# Patient Record
Sex: FEMALE | Race: WHITE | NOT HISPANIC OR LATINO | Employment: FULL TIME | URBAN - METROPOLITAN AREA
[De-identification: names, ages, dates, MRNs, and addresses within clinical notes are randomized per-mention and may not be internally consistent; named-entity substitution may affect disease eponyms.]

---

## 2017-03-06 ENCOUNTER — ANESTHESIA (OUTPATIENT)
Dept: PERIOP | Facility: HOSPITAL | Age: 47
End: 2017-03-06

## 2017-03-06 ENCOUNTER — ANESTHESIA EVENT (OUTPATIENT)
Dept: PERIOP | Facility: HOSPITAL | Age: 47
End: 2017-03-06

## 2017-03-06 ENCOUNTER — HOSPITAL ENCOUNTER (OUTPATIENT)
Facility: HOSPITAL | Age: 47
Setting detail: HOSPITAL OUTPATIENT SURGERY
Discharge: HOME | End: 2017-03-06
Attending: OBSTETRICS & GYNECOLOGY | Admitting: OBSTETRICS & GYNECOLOGY

## 2017-03-06 VITALS
HEART RATE: 60 BPM | OXYGEN SATURATION: 98 % | WEIGHT: 141.9 LBS | SYSTOLIC BLOOD PRESSURE: 128 MMHG | BODY MASS INDEX: 24.22 KG/M2 | HEIGHT: 64 IN | DIASTOLIC BLOOD PRESSURE: 60 MMHG | TEMPERATURE: 97.9 F | RESPIRATION RATE: 18 BRPM

## 2017-03-06 DIAGNOSIS — O03.4 INCOMPLETE MISCARRIAGE: ICD-10-CM

## 2017-03-06 LAB
ABO GROUP BLD: NORMAL
BASOPHILS # BLD AUTO: 0.01 10*3/MM3 (ref 0–0.2)
BASOPHILS NFR BLD AUTO: 0.1 % (ref 0–1.5)
BLD GP AB SCN SERPL QL: NEGATIVE
DEPRECATED RDW RBC AUTO: 42.3 FL (ref 37–54)
EOSINOPHIL # BLD AUTO: 0.06 10*3/MM3 (ref 0–0.7)
EOSINOPHIL NFR BLD AUTO: 0.7 % (ref 0.3–6.2)
ERYTHROCYTE [DISTWIDTH] IN BLOOD BY AUTOMATED COUNT: 12.5 % (ref 11.7–13)
HCT VFR BLD AUTO: 38.7 % (ref 35.6–45.5)
HGB BLD-MCNC: 13.1 G/DL (ref 11.9–15.5)
IMM GRANULOCYTES # BLD: 0 10*3/MM3 (ref 0–0.03)
IMM GRANULOCYTES NFR BLD: 0 % (ref 0–0.5)
LYMPHOCYTES # BLD AUTO: 1.75 10*3/MM3 (ref 0.9–4.8)
LYMPHOCYTES NFR BLD AUTO: 19.1 % (ref 19.6–45.3)
MCH RBC QN AUTO: 31.3 PG (ref 26.9–32)
MCHC RBC AUTO-ENTMCNC: 33.9 G/DL (ref 32.4–36.3)
MCV RBC AUTO: 92.6 FL (ref 80.5–98.2)
MONOCYTES # BLD AUTO: 0.8 10*3/MM3 (ref 0.2–1.2)
MONOCYTES NFR BLD AUTO: 8.8 % (ref 5–12)
NEUTROPHILS # BLD AUTO: 6.52 10*3/MM3 (ref 1.9–8.1)
NEUTROPHILS NFR BLD AUTO: 71.3 % (ref 42.7–76)
PLATELET # BLD AUTO: 226 10*3/MM3 (ref 140–500)
PMV BLD AUTO: 9.5 FL (ref 6–12)
RBC # BLD AUTO: 4.18 10*6/MM3 (ref 3.9–5.2)
RH BLD: POSITIVE
WBC NRBC COR # BLD: 9.14 10*3/MM3 (ref 4.5–10.7)

## 2017-03-06 PROCEDURE — 25010000002 HYDROMORPHONE PER 4 MG: Performed by: NURSE ANESTHETIST, CERTIFIED REGISTERED

## 2017-03-06 PROCEDURE — 86850 RBC ANTIBODY SCREEN: CPT | Performed by: OBSTETRICS & GYNECOLOGY

## 2017-03-06 PROCEDURE — 25010000002 FENTANYL CITRATE (PF) 100 MCG/2ML SOLUTION: Performed by: NURSE ANESTHETIST, CERTIFIED REGISTERED

## 2017-03-06 PROCEDURE — 25010000002 MIDAZOLAM PER 1 MG: Performed by: ANESTHESIOLOGY

## 2017-03-06 PROCEDURE — 88305 TISSUE EXAM BY PATHOLOGIST: CPT | Performed by: OBSTETRICS & GYNECOLOGY

## 2017-03-06 PROCEDURE — 85025 COMPLETE CBC W/AUTO DIFF WBC: CPT | Performed by: OBSTETRICS & GYNECOLOGY

## 2017-03-06 PROCEDURE — 86900 BLOOD TYPING SEROLOGIC ABO: CPT | Performed by: OBSTETRICS & GYNECOLOGY

## 2017-03-06 PROCEDURE — 25010000002 PROPOFOL 10 MG/ML EMULSION: Performed by: NURSE ANESTHETIST, CERTIFIED REGISTERED

## 2017-03-06 PROCEDURE — 86901 BLOOD TYPING SEROLOGIC RH(D): CPT | Performed by: OBSTETRICS & GYNECOLOGY

## 2017-03-06 PROCEDURE — 25010000002 DEXAMETHASONE PER 1 MG: Performed by: NURSE ANESTHETIST, CERTIFIED REGISTERED

## 2017-03-06 PROCEDURE — 25010000002 METHYLERGONOVINE MALEATE PER 0.2 MG: Performed by: NURSE ANESTHETIST, CERTIFIED REGISTERED

## 2017-03-06 PROCEDURE — 25010000002 FENTANYL CITRATE (PF) 100 MCG/2ML SOLUTION

## 2017-03-06 RX ORDER — LABETALOL HYDROCHLORIDE 5 MG/ML
5 INJECTION, SOLUTION INTRAVENOUS
Status: DISCONTINUED | OUTPATIENT
Start: 2017-03-06 | End: 2017-03-06 | Stop reason: HOSPADM

## 2017-03-06 RX ORDER — MIDAZOLAM HYDROCHLORIDE 1 MG/ML
2 INJECTION INTRAMUSCULAR; INTRAVENOUS
Status: DISCONTINUED | OUTPATIENT
Start: 2017-03-06 | End: 2017-03-06 | Stop reason: HOSPADM

## 2017-03-06 RX ORDER — SODIUM CHLORIDE 0.9 % (FLUSH) 0.9 %
1-10 SYRINGE (ML) INJECTION AS NEEDED
Status: DISCONTINUED | OUTPATIENT
Start: 2017-03-06 | End: 2017-03-06 | Stop reason: HOSPADM

## 2017-03-06 RX ORDER — FENTANYL CITRATE 50 UG/ML
50 INJECTION, SOLUTION INTRAMUSCULAR; INTRAVENOUS
Status: DISCONTINUED | OUTPATIENT
Start: 2017-03-06 | End: 2017-03-06 | Stop reason: HOSPADM

## 2017-03-06 RX ORDER — PROMETHAZINE HYDROCHLORIDE 25 MG/1
25 SUPPOSITORY RECTAL ONCE AS NEEDED
Status: DISCONTINUED | OUTPATIENT
Start: 2017-03-06 | End: 2017-03-06 | Stop reason: HOSPADM

## 2017-03-06 RX ORDER — PROMETHAZINE HYDROCHLORIDE 25 MG/ML
12.5 INJECTION, SOLUTION INTRAMUSCULAR; INTRAVENOUS ONCE AS NEEDED
Status: DISCONTINUED | OUTPATIENT
Start: 2017-03-06 | End: 2017-03-06 | Stop reason: HOSPADM

## 2017-03-06 RX ORDER — PROPOFOL 10 MG/ML
VIAL (ML) INTRAVENOUS AS NEEDED
Status: DISCONTINUED | OUTPATIENT
Start: 2017-03-06 | End: 2017-03-06 | Stop reason: SURG

## 2017-03-06 RX ORDER — SODIUM CHLORIDE, SODIUM LACTATE, POTASSIUM CHLORIDE, CALCIUM CHLORIDE 600; 310; 30; 20 MG/100ML; MG/100ML; MG/100ML; MG/100ML
9 INJECTION, SOLUTION INTRAVENOUS CONTINUOUS
Status: DISCONTINUED | OUTPATIENT
Start: 2017-03-06 | End: 2017-03-06 | Stop reason: HOSPADM

## 2017-03-06 RX ORDER — METHYLERGONOVINE MALEATE 0.2 MG/ML
INJECTION INTRAVENOUS AS NEEDED
Status: DISCONTINUED | OUTPATIENT
Start: 2017-03-06 | End: 2017-03-06 | Stop reason: SURG

## 2017-03-06 RX ORDER — LIDOCAINE HYDROCHLORIDE 20 MG/ML
INJECTION, SOLUTION INFILTRATION; PERINEURAL AS NEEDED
Status: DISCONTINUED | OUTPATIENT
Start: 2017-03-06 | End: 2017-03-06 | Stop reason: SURG

## 2017-03-06 RX ORDER — FENTANYL CITRATE 50 UG/ML
INJECTION, SOLUTION INTRAMUSCULAR; INTRAVENOUS AS NEEDED
Status: DISCONTINUED | OUTPATIENT
Start: 2017-03-06 | End: 2017-03-06 | Stop reason: SURG

## 2017-03-06 RX ORDER — PROMETHAZINE HYDROCHLORIDE 25 MG/1
25 TABLET ORAL ONCE AS NEEDED
Status: DISCONTINUED | OUTPATIENT
Start: 2017-03-06 | End: 2017-03-06 | Stop reason: HOSPADM

## 2017-03-06 RX ORDER — MIDAZOLAM HYDROCHLORIDE 1 MG/ML
1 INJECTION INTRAMUSCULAR; INTRAVENOUS
Status: DISCONTINUED | OUTPATIENT
Start: 2017-03-06 | End: 2017-03-06 | Stop reason: HOSPADM

## 2017-03-06 RX ORDER — PROMETHAZINE HYDROCHLORIDE 25 MG/1
12.5 TABLET ORAL ONCE AS NEEDED
Status: DISCONTINUED | OUTPATIENT
Start: 2017-03-06 | End: 2017-03-06 | Stop reason: HOSPADM

## 2017-03-06 RX ORDER — HYDROMORPHONE HYDROCHLORIDE 1 MG/ML
0.5 INJECTION, SOLUTION INTRAMUSCULAR; INTRAVENOUS; SUBCUTANEOUS
Status: DISCONTINUED | OUTPATIENT
Start: 2017-03-06 | End: 2017-03-06 | Stop reason: HOSPADM

## 2017-03-06 RX ORDER — FLUMAZENIL 0.1 MG/ML
0.2 INJECTION INTRAVENOUS AS NEEDED
Status: DISCONTINUED | OUTPATIENT
Start: 2017-03-06 | End: 2017-03-06 | Stop reason: HOSPADM

## 2017-03-06 RX ORDER — DIPHENHYDRAMINE HYDROCHLORIDE 50 MG/ML
12.5 INJECTION INTRAMUSCULAR; INTRAVENOUS
Status: DISCONTINUED | OUTPATIENT
Start: 2017-03-06 | End: 2017-03-06 | Stop reason: HOSPADM

## 2017-03-06 RX ORDER — FENTANYL CITRATE 50 UG/ML
INJECTION, SOLUTION INTRAMUSCULAR; INTRAVENOUS
Status: COMPLETED
Start: 2017-03-06 | End: 2017-03-06

## 2017-03-06 RX ORDER — OXYCODONE AND ACETAMINOPHEN 7.5; 325 MG/1; MG/1
1 TABLET ORAL ONCE AS NEEDED
Status: COMPLETED | OUTPATIENT
Start: 2017-03-06 | End: 2017-03-06

## 2017-03-06 RX ORDER — ONDANSETRON 2 MG/ML
4 INJECTION INTRAMUSCULAR; INTRAVENOUS ONCE AS NEEDED
Status: DISCONTINUED | OUTPATIENT
Start: 2017-03-06 | End: 2017-03-06 | Stop reason: HOSPADM

## 2017-03-06 RX ORDER — PROMETHAZINE HYDROCHLORIDE 25 MG/ML
5 INJECTION, SOLUTION INTRAMUSCULAR; INTRAVENOUS
Status: DISCONTINUED | OUTPATIENT
Start: 2017-03-06 | End: 2017-03-06 | Stop reason: HOSPADM

## 2017-03-06 RX ORDER — NALOXONE HCL 0.4 MG/ML
0.2 VIAL (ML) INJECTION AS NEEDED
Status: DISCONTINUED | OUTPATIENT
Start: 2017-03-06 | End: 2017-03-06 | Stop reason: HOSPADM

## 2017-03-06 RX ORDER — LIDOCAINE HYDROCHLORIDE 10 MG/ML
INJECTION, SOLUTION EPIDURAL; INFILTRATION; INTRACAUDAL; PERINEURAL AS NEEDED
Status: DISCONTINUED | OUTPATIENT
Start: 2017-03-06 | End: 2017-03-06 | Stop reason: HOSPADM

## 2017-03-06 RX ORDER — HYDROCODONE BITARTRATE AND ACETAMINOPHEN 7.5; 325 MG/1; MG/1
1 TABLET ORAL ONCE AS NEEDED
Status: DISCONTINUED | OUTPATIENT
Start: 2017-03-06 | End: 2017-03-06 | Stop reason: HOSPADM

## 2017-03-06 RX ORDER — HYDRALAZINE HYDROCHLORIDE 20 MG/ML
5 INJECTION INTRAMUSCULAR; INTRAVENOUS
Status: DISCONTINUED | OUTPATIENT
Start: 2017-03-06 | End: 2017-03-06 | Stop reason: HOSPADM

## 2017-03-06 RX ORDER — DEXAMETHASONE SODIUM PHOSPHATE 10 MG/ML
INJECTION INTRAMUSCULAR; INTRAVENOUS AS NEEDED
Status: DISCONTINUED | OUTPATIENT
Start: 2017-03-06 | End: 2017-03-06 | Stop reason: SURG

## 2017-03-06 RX ORDER — FAMOTIDINE 10 MG/ML
20 INJECTION, SOLUTION INTRAVENOUS ONCE
Status: COMPLETED | OUTPATIENT
Start: 2017-03-06 | End: 2017-03-06

## 2017-03-06 RX ADMIN — SODIUM CHLORIDE, POTASSIUM CHLORIDE, SODIUM LACTATE AND CALCIUM CHLORIDE: 600; 310; 30; 20 INJECTION, SOLUTION INTRAVENOUS at 11:42

## 2017-03-06 RX ADMIN — DEXAMETHASONE SODIUM PHOSPHATE 8 MG: 10 INJECTION INTRAMUSCULAR; INTRAVENOUS at 12:01

## 2017-03-06 RX ADMIN — HYDROMORPHONE HYDROCHLORIDE 0.5 MG: 1 INJECTION, SOLUTION INTRAMUSCULAR; INTRAVENOUS; SUBCUTANEOUS at 13:11

## 2017-03-06 RX ADMIN — FENTANYL CITRATE 50 MCG: 50 INJECTION INTRAMUSCULAR; INTRAVENOUS at 12:57

## 2017-03-06 RX ADMIN — EPHEDRINE SULFATE 5 MG: 50 INJECTION INTRAMUSCULAR; INTRAVENOUS; SUBCUTANEOUS at 11:58

## 2017-03-06 RX ADMIN — METHYLERGONOVINE MALEATE 200 MCG: 0.2 INJECTION INTRAMUSCULAR; INTRAVENOUS at 12:06

## 2017-03-06 RX ADMIN — HYDROMORPHONE HYDROCHLORIDE 0.5 MG: 1 INJECTION, SOLUTION INTRAMUSCULAR; INTRAVENOUS; SUBCUTANEOUS at 13:31

## 2017-03-06 RX ADMIN — FAMOTIDINE 20 MG: 10 INJECTION, SOLUTION INTRAVENOUS at 11:04

## 2017-03-06 RX ADMIN — OXYCODONE HYDROCHLORIDE AND ACETAMINOPHEN 1 TABLET: 7.5; 325 TABLET ORAL at 13:31

## 2017-03-06 RX ADMIN — FENTANYL CITRATE 50 MCG: 50 INJECTION INTRAMUSCULAR; INTRAVENOUS at 11:46

## 2017-03-06 RX ADMIN — HYDROMORPHONE HYDROCHLORIDE 0.5 MG: 1 INJECTION, SOLUTION INTRAMUSCULAR; INTRAVENOUS; SUBCUTANEOUS at 13:20

## 2017-03-06 RX ADMIN — PROPOFOL 200 MG: 10 INJECTION, EMULSION INTRAVENOUS at 11:46

## 2017-03-06 RX ADMIN — MIDAZOLAM 2 MG: 1 INJECTION INTRAMUSCULAR; INTRAVENOUS at 11:05

## 2017-03-06 RX ADMIN — LIDOCAINE HYDROCHLORIDE 60 MG: 20 INJECTION, SOLUTION INFILTRATION; PERINEURAL at 11:46

## 2017-03-06 RX ADMIN — SODIUM CHLORIDE, POTASSIUM CHLORIDE, SODIUM LACTATE AND CALCIUM CHLORIDE 9 ML/HR: 600; 310; 30; 20 INJECTION, SOLUTION INTRAVENOUS at 11:04

## 2017-03-06 RX ADMIN — OXYTOCIN 10 UNITS: 10 INJECTION, SOLUTION INTRAMUSCULAR; INTRAVENOUS at 11:55

## 2017-03-06 RX ADMIN — FENTANYL CITRATE 50 MCG: 50 INJECTION INTRAMUSCULAR; INTRAVENOUS at 13:03

## 2017-03-06 NOTE — PLAN OF CARE
Problem: Patient Care Overview (Adult)  Goal: Plan of Care Review  Outcome: Outcome(s) achieved Date Met:  03/06/17 03/06/17 1545   Coping/Psychosocial Response Interventions   Plan Of Care Reviewed With patient;spouse   Patient Care Overview   Progress progress toward functional goals as expected   Outcome Evaluation   Outcome Summary/Follow up Plan ready for discharge       Goal: Adult Individualization and Mutuality  Outcome: Outcome(s) achieved Date Met:  03/06/17  Goal: Discharge Needs Assessment  Outcome: Outcome(s) achieved Date Met:  03/06/17    Problem: Perioperative Period (Adult)  Goal: Signs and Symptoms of Listed Potential Problems Will be Absent or Manageable (Perioperative Period)  Outcome: Outcome(s) achieved Date Met:  03/06/17 03/06/17 1545   Perioperative Period   Problems Assessed (Perioperative Period) all   Problems Present (Perioperative Period) pain

## 2017-03-06 NOTE — PERIOPERATIVE NURSING NOTE
Called CRNA to determine amount of IVR given during procedure.  Mariajose Beltran CRNA  reports 700cc IVF given during procedure; however, she is out of the hospital and unable to document it at this time.  Wendy Bernard updated.

## 2017-03-06 NOTE — ANESTHESIA PREPROCEDURE EVALUATION
Anesthesia Evaluation     Patient summary reviewed and Nursing notes reviewed   no history of anesthetic complications:     Airway   Mallampati: II  TM distance: >3 FB  Neck ROM: full  no difficulty expected  Dental - normal exam     Pulmonary - negative pulmonary ROS    breath sounds clear to auscultation  (-) shortness of breath, sleep apnea, decreased breath sounds, wheezes  Cardiovascular - normal exam  Exercise tolerance: good (4-7 METS)    Rhythm: regular  Rate: normal    (-) past MI, angina, CHF, orthopnea, PND, VIEYRA, PVD      Neuro/Psych- negative ROS  (-) seizures, neuromuscular disease, TIA, CVA, dizziness/light headedness, weakness, numbness  GI/Hepatic/Renal/Endo - negative ROS   (-) liver disease, renal disease, diabetes    Musculoskeletal (-) negative ROS    Abdominal  - normal exam   Substance History - negative use  (-) alcohol use, drug use     OB/GYN negative ob/gyn ROS         Other - negative ROS                                   Anesthesia Plan    ASA 1     general     intravenous induction   Anesthetic plan and risks discussed with patient.    Plan discussed with CRNA.

## 2017-03-06 NOTE — ANESTHESIA POSTPROCEDURE EVALUATION
Patient: Jennjairo WOOD    Procedure Summary     Date Anesthesia Start Anesthesia Stop Room / Location    03/06/17 1142 1233  [facility] / [facility]       Procedure Diagnosis Surgeon Provider    DILATATION AND CURETTAGE WITH SUCTION (N/A Vagina) No diagnosis on file. MD Dangelo Israel MD          Anesthesia Type: general  Last vitals  /60 (03/06/17 1600)    Temp      Pulse 60 (03/06/17 1600)   Resp 18 (03/06/17 1600)    SpO2 98 % (03/06/17 1600)      Post Anesthesia Care and Evaluation    Patient location during evaluation: PACU  Patient participation: complete - patient participated  Level of consciousness: awake and alert  Pain management: adequate  Airway patency: patent  Anesthetic complications: No anesthetic complications    Cardiovascular status: acceptable  Respiratory status: acceptable  Hydration status: acceptable

## 2017-03-06 NOTE — PERIOPERATIVE NURSING NOTE
"While getting dressed, patient broke out in sweat, \"I feel so hot.\"  Gave her sprite to drink, temp 97.9 oral, feels \"a little lightheaded.\"   "

## 2017-03-06 NOTE — ANESTHESIA PROCEDURE NOTES
Airway  Urgency: elective    Airway not difficult    General Information and Staff    Patient location during procedure: OR  Anesthesiologist: AL SEALS  CRNA: JOSELINE BENAVIDES    Indications and Patient Condition  Indications for airway management: airway protection    Preoxygenated: yes  MILS maintained throughout  Mask difficulty assessment: 1 - vent by mask    Final Airway Details  Final airway type: supraglottic airway      Successful airway: ProSeal  Size 4    Number of attempts at approach: 1    Additional Comments  Smooth iv induction with no complications

## 2017-03-06 NOTE — PERIOPERATIVE NURSING NOTE
"Patient feeling much better.  VSS.  No diaphoresis. Able to walk without assist, slight lightheadedness.\"Ready to go home.\"  "

## 2017-03-06 NOTE — PERIOPERATIVE NURSING NOTE
Call to anesthesia.  Informed of patient diaphoresis, BP, lightheadedness.  Instructed to keep patient here another 30minutes and evaluate.

## 2017-03-06 NOTE — PERIOPERATIVE NURSING NOTE
"Patient no longer diaphoretic, states her pain is less and she feels a little \"better.\"  Peripad with moderate drainage.  Patient able to stand without assist, a little \"lightheaded.\"  "

## 2017-03-06 NOTE — PLAN OF CARE
Problem: Patient Care Overview (Adult)  Goal: Plan of Care Review  Outcome: Ongoing (interventions implemented as appropriate)    03/06/17 1001   Coping/Psychosocial Response Interventions   Plan Of Care Reviewed With patient   Patient Care Overview   Progress no change       Goal: Adult Individualization and Mutuality  Outcome: Ongoing (interventions implemented as appropriate)    03/06/17 1001   Individualization   Patient Specific Preferences none       Goal: Discharge Needs Assessment  Outcome: Ongoing (interventions implemented as appropriate)    03/06/17 1001   Discharge Needs Assessment   Concerns To Be Addressed no discharge needs identified         Problem: Perioperative Period (Adult)  Goal: Signs and Symptoms of Listed Potential Problems Will be Absent or Manageable (Perioperative Period)  Outcome: Ongoing (interventions implemented as appropriate)    03/06/17 1001   Perioperative Period   Problems Assessed (Perioperative Period) all   Problems Present (Perioperative Period) situational response

## 2017-03-07 LAB
CYTO UR: NORMAL
LAB AP CASE REPORT: NORMAL
Lab: NORMAL
PATH REPORT.FINAL DX SPEC: NORMAL
PATH REPORT.GROSS SPEC: NORMAL

## 2017-03-07 NOTE — OP NOTE
Date of Procedure:  2017     PREOPERATIVE DIAGNOSIS: A 46-year-old at 7 weeks with missed  with twin gestation.     POSTOPERATIVE DIAGNOSIS: A 46-year-old at 7 weeks with missed  with twin gestation.     PROCEDURES PERFORMED:  1. Suction dilatation and curettage.   2. Paracervical block.     SURGEON: Ingrid Jacobsen MD     ASSISTANT: None.     COMPLICATION: None.     ANESTHESIA: General.     ESTIMATED BLOOD LOSS: 250 mL.     FINDINGS: Obvious products of conception.     DESCRIPTION OF PROCEDURE: The patient was taken to the operating room where general anesthesia was obtained without difficulty. She was placed in the dorsal lithotomy position in universal Yobani stirrups and prepped and draped in the standard sterile fashion. Paracervical block was performed using 10 mL of 1% plain lidocaine. Cervix was easily dilated to accommodate a #20 Caceres dilator. Curved suction cannula was inserted, 8 mm, and obvious products were returned. Several passes were performed. There was a fair amount of bleeding noted and the patient was given Methergine 0.2 IM, as well as 10 units of Pitocin in her IV fluids. After several passes and then a #1 curette was used gritty texture was noted throughout and the bleeding was minimal; therefore, the procedure was terminated. All needle, lap, and sponge counts were correct. The patient went to the recovery room in good condition.             Ingrid Jacobsen M.D.  KM:ab  D:   2017 19:11:54  T:   2017 20:45:35  Job ID:   [id number]  Document ID:   [id number]  cc:

## 2024-10-30 ENCOUNTER — TELEPHONE (OUTPATIENT)
Dept: SURGERY | Facility: CLINIC | Age: 54
End: 2024-10-30
Payer: COMMERCIAL

## 2024-10-30 NOTE — TELEPHONE ENCOUNTER
New patient chart prepped for Dr. Broderick review and scheduling (referral for Right breast invasive carcinoma). [facility] FISH Pending.     Outside pathology review requested on tissue pathology from 10/25/2024.

## 2024-10-31 ENCOUNTER — TELEPHONE (OUTPATIENT)
Dept: SURGERY | Facility: CLINIC | Age: 54
End: 2024-10-31
Payer: COMMERCIAL

## 2024-10-31 DIAGNOSIS — C50.911 MALIGNANT NEOPLASM OF RIGHT FEMALE BREAST, UNSPECIFIED ESTROGEN RECEPTOR STATUS, UNSPECIFIED SITE OF BREAST: Primary | ICD-10-CM

## 2024-10-31 NOTE — TELEPHONE ENCOUNTER
Spoke with patient to inform them of the MRI appt & new patient appt with Dr. Broderick.   Patient is on the wait list for the MRI and knows that we will contact her to move the appt up if the MRI has moved up as well.

## 2024-11-13 ENCOUNTER — TELEPHONE (OUTPATIENT)
Dept: SURGERY | Facility: CLINIC | Age: 54
End: 2024-11-13
Payer: COMMERCIAL

## 2024-11-13 NOTE — TELEPHONE ENCOUNTER
Spoke with patient and was informed that the patient would be getting their MRI completed with [facility] on 11/15/24.   Patient would still like to keep her 12/02 appt with Dr. Broderick for a 2nd opinion.    I also informed the patient that surgery with [facility] would be in Jan if she decided to have her care under Dr. Broderick and the patient was okay with that.

## 2024-11-25 ENCOUNTER — OFFICE VISIT (OUTPATIENT)
Dept: SURGERY | Facility: CLINIC | Age: 54
End: 2024-11-25
Payer: COMMERCIAL

## 2024-11-25 VITALS
DIASTOLIC BLOOD PRESSURE: 68 MMHG | HEIGHT: 64 IN | SYSTOLIC BLOOD PRESSURE: 108 MMHG | WEIGHT: 156 LBS | BODY MASS INDEX: 26.63 KG/M2 | HEART RATE: 77 BPM | OXYGEN SATURATION: 100 %

## 2024-11-25 DIAGNOSIS — N64.89 RADIAL SCAR OF LEFT BREAST: ICD-10-CM

## 2024-11-25 DIAGNOSIS — Z17.0 MALIGNANT NEOPLASM OF LOWER-INNER QUADRANT OF RIGHT BREAST OF FEMALE, ESTROGEN RECEPTOR POSITIVE: Primary | ICD-10-CM

## 2024-11-25 DIAGNOSIS — N64.89 BREAST ASYMMETRY: ICD-10-CM

## 2024-11-25 DIAGNOSIS — D24.2 BREAST FIBROADENOMA, LEFT: ICD-10-CM

## 2024-11-25 DIAGNOSIS — C50.311 MALIGNANT NEOPLASM OF LOWER-INNER QUADRANT OF RIGHT BREAST OF FEMALE, ESTROGEN RECEPTOR POSITIVE: Primary | ICD-10-CM

## 2024-11-25 PROCEDURE — 99205 OFFICE O/P NEW HI 60 MIN: CPT | Performed by: SURGERY

## 2024-11-25 RX ORDER — MELOXICAM 15 MG/1
15 TABLET ORAL DAILY
COMMUNITY
Start: 2021-11-12 | End: 2025-11-23

## 2024-11-25 RX ORDER — FOLIC ACID 1 MG/1
1 TABLET ORAL DAILY
COMMUNITY

## 2024-11-25 RX ORDER — PANTOPRAZOLE SODIUM 40 MG/1
40 TABLET, DELAYED RELEASE ORAL DAILY
COMMUNITY
Start: 2024-07-26 | End: 2025-07-26

## 2024-11-25 RX ORDER — CHOLECALCIFEROL (VITAMIN D3) 25 MCG
25 TABLET ORAL DAILY
COMMUNITY

## 2024-11-25 RX ORDER — VALACYCLOVIR HYDROCHLORIDE 500 MG/1
500 TABLET, FILM COATED ORAL DAILY
COMMUNITY
Start: 2024-10-18

## 2024-11-25 RX ORDER — SUMATRIPTAN SUCCINATE 100 MG/1
50-100 TABLET ORAL ONCE AS NEEDED
COMMUNITY
Start: 2022-06-07 | End: 2025-09-17

## 2024-11-25 NOTE — PROGRESS NOTES
Chief Complaint: Jenn WOOD is a 54 y.o. female who was seen in consultation at the request of Micheline Lo,Gladys  for newly diagnosed breast cancer    History of Present Illness:  Patient presents with newly diagnosed breast cancer. She noted no new masses, skin changes, nipple discharge, nipple changes prior to her most recent imaging.    Her most recent imaging includes the followin2019, Left Diagnostic MMG with Da and CAD and Limited Left Breast U/S ([facility])  Final Assessment: Negative (BI-RADS 1)    2020, Digital Bilateral Screening MMG w/Da ([facility])  The breasts are extremely dense.  BI-RADS 1 Negative    2022, MMG Screening Digital Da Bilateral w/CAD ([facility])  The breasts are extremely dense.  BI-RADS 1 Negative    10/05/2023, MMG Screening Digital Da Bilateral w/CAD ([facility])  There is questionable area of architectural distortion in the upper outer quadrant of the right breast at mid depth.  BI-RADS 0    10/09/2023, MMG Da Diagnostic Right Breast ([facility])  The breasts are heterogeneously dense. There is persistent small areas of architectural distortion in the right upper outer quadrant at middle depth (located 6.7 cm from the nipple on the spot Da CC exam.  BI-RADS 4B    10/19/2023, Right Breast Ultrasound ([facility])  Sonographic survey of the upper outer quadrant right breast was performed by the sonographer and me to evaluate for a possible sonographic correlate. No suspicious abnormality identified. Previously described area of architectural distortion in the upper outer quadrant right breast could not be reproduced for a stereotactic/tomosynthesis guided biopsy.  BI-RADS 3    2024, MMG Da Diagnostic Right Breast ([facility])  Right Mammogram: The breast is heterogeneously dense. The previously described area of questionable distortion in the upper outer quadrant of the right  breast is not visualized on today's exam.  BI-RADS 1: Negative     10/18/2024, MMG Da Screening Bilateral ([facility])  Heterogeneously dense.  This is an area of architectural distortion in the lower inner quadrant of the right breast at interior depth.  BI-RADS 0    10/22/2024, Right Digital Diagnostic MMG w/CAD and Right Breast U/S ([facility])  There is a persistent 20 mm area of architectural distortion in the lower inner right breast at anterior depth.  Sonographic evaluation of the right breast was performed at 5:00, 2 cm from the nipple. There is an irregular hypoechoic mass which measures 14 mm x 11 mm x 12 mm corresponding to the mammographic finding.  BI-RADS 4C      She had a biopsy on the following day that showed:   10/25/24 [place]  US GUIDED BREAST BX RIGHT   12 G Marquee, 3 core specimens, a metallic heart shaped marker clip was placed.     10/25/2024, [facility] Breast Clip Post Biopsy Right ([facility])  The heart shaped biopsy tissue marker is in expected position.  Post procedure MMG for Marker Placement.    10/25/2024, Surgical Specimen Pathology Report  Right Breast, 5;00, 2 CMFN, U/S-Guided Core Needle Biopsies for a 1.4 cm mass/distortion  BI-RADS 4C: HEART CLIP   INVASIVE DUCTAL CARCINOMA    ER 90% STRONG  IA 90% STRONG   HER2 (Score 2+)  Glandular (Acinar)/Tubular Differentiation: Score 2  Mitotic Rate: Score 1  Overall Grade 1  Tumor Size 7 mm  Ductal Carcinoma in situ (DCIS): Not identified  Lymphatic and /or vascular invasion: Not identified    HER-2/NEHEMIAS, FISH   FISH ANALYSIS HER2 (ERBB2) AMPLIFICATION  INTERPRETATION  Amplification of the HER2 (ERBB2) gene is not detected.   HER2 (ERBB2) (red) 3.2  Ration of HER2 (ERBB2) to CEP17 1.6    11/22/24 [facility]  OUTSIDE PATHOLOGY CONSULTATION   OUTSIDE [facility] CONSULTATION [id number]  1.  Right breast, 5:00, ultrasound-guided core biopsies for mass (heart clip): INVASIVE MAMMARY CARCINOMA, NO SPECIAL TYPE (INVASIVE DUCTAL  CARCINOMA).                 -Histologic grade: Paul Smiths histologic score I (tubules = 2, nuclei = 2, mitosis = 1).               -Carcinoma involves both tissue cores, measuring up to 7 mm maximally.               -No definitive in situ component identified.  -Hormone receptors: ER 90% positive (Antoinette 8/8), SD 90% positive (Antoinette 8/8), HER2 2+ by IHC, HER2/jovana FISH report not provided, Ki67 10%.      She then had a MRI:    11/14/24 [facility] MRI BREAST   BILATERAL BREAST MRI   Irregular spiculated mass in the lower slightly inner anterior right   breast at the site of biopsy-proven malignancy measures up to 10 mm. There is a central tissue marker. There is no lymphadrnopathy. No evidence of malignancy in the left breast. BI-RADS Category 6    She had 2 previous excisions with me in 2012 and 2013, radial scar and FA, both in the LEFT breast.  She has her uterus and ovaries, is premenopausal, and takes nor hormones.  Her family history includes the following:   No live births, 3 miscarriages, no sisters, no aunts.  She is here for evaluation.    Review of Systems:  Review of Systems   All other systems reviewed and are negative.       Past Medical and Surgical History:  Breast Biopsy History:  2012 LEFT BREAST [facility].   Breast Cancer HIstory:  Patient does not have a past medical history of breast cancer.  Breast Operations, and year:  Patient has had the following breast biopsies: 2012  LEFT BREAST EXCISION, [facility] DR. FIDELINA CLEMONS.    Obstetric/Gynecologic History:  Age menstrual periods began: 13  Patient is premenopausal, first day of last period: 11/20/24  Number of pregnancies:3  Number of live births: 0  Number of abortions or miscarriages: 3  Age of delivery of first child: N/A   BREAST FEEDING: N/A   Length of time taking birth control pills: 25 YRS   Patient has never taken hormone replacement    PATIENT HAS BOTH OVARIES AND UTERUS.   Past Surgical History:   Procedure Laterality Date    BREAST BIOPSY Left  "02/13/2012    radial scar    BREAST EXCISIONAL BIOPSY Left 04/11/2012    left radial scar and calcifications    D & C WITH SUCTION N/A 3/6/2017    Procedure: DILATATION AND CURETTAGE WITH SUCTION;  Surgeon: Ingrid Jacobsen MD;  Location: [facility];  Service:      Past Medical History:   Diagnosis Date    Infertility, female     UTI (urinary tract infection)     RECURRENT       Prior Hospitalizations, other than for surgery or childbirth, and year:  NONE     Social History     Socioeconomic History    Marital status:    Tobacco Use    Smoking status: Never    Smokeless tobacco: Never   Vaping Use    Vaping status: Never Used   Substance and Sexual Activity    Alcohol use: Yes     Comment: occ    Drug use: No    Sexual activity: Yes     Partners: Male     Birth control/protection: None     Patient is .  Patient is employed full time with the following occupation: HR  Patient does not drink caffeine.    Family History:  Family History   Problem Relation Age of Onset    Hypertension Mother     Hyperlipidemia Mother     Cancer Father         agent orange    Hyperlipidemia Other         Unspecified Grandmother    Hypertension Other         Unspecified Grandmother    Heart attack Other         Unspecified Uncle       Vital Signs:  /68 (BP Location: Right arm, Patient Position: Sitting, Cuff Size: Adult)   Pulse 77   Ht 162.6 cm (64\")   Wt 70.8 kg (156 lb)   SpO2 100%   BMI 26.78 kg/m²      Medications:    Current Outpatient Medications:     cholecalciferol (Vitamin D, Cholecalciferol,) 25 MCG (1000 UT) tablet, Take 1 tablet by mouth Daily., Disp: , Rfl:     Coenzyme Q10 10 MG capsule, Take  by mouth., Disp: , Rfl:     folic acid (FOLVITE) 1 MG tablet, Take 1 tablet by mouth Daily., Disp: , Rfl:     ZOHAIB ROOT PO, Take  by mouth Daily., Disp: , Rfl:     meloxicam (MOBIC) 15 MG tablet, Take 1 tablet by mouth Daily., Disp: , Rfl:     pantoprazole (PROTONIX) 40 MG EC tablet, Take 1 tablet by mouth " "Daily., Disp: , Rfl:     PROBIOTIC PRODUCT PO, Take  by mouth Daily., Disp: , Rfl:     SUMAtriptan (IMITREX) 100 MG tablet, Take 0.5-1 tablets by mouth 1 (One) Time As Needed., Disp: , Rfl:     valACYclovir (VALTREX) 500 MG tablet, Take 1 tablet by mouth Daily., Disp: , Rfl:      Allergies:  Allergies   Allergen Reactions    Latex Rash       Physical Examination:  /68 (BP Location: Right arm, Patient Position: Sitting, Cuff Size: Adult)   Pulse 77   Ht 162.6 cm (64\")   Wt 70.8 kg (156 lb)   SpO2 100%   BMI 26.78 kg/m²   General Appearance:  Patient is in no distress.  She is well kept and has an average build.   Psychiatric:  Patient with appropriate mood and affect. Alert and oriented to self, time, and place.    Breast, RIGHT:  large sized, symmetric with the contralateral side except that the RIGHT areola is smaller than the LEFT.  Breast skin is without erythema, edema, rashes.  There are no visible abnormalities upon inspection during the arm-raising maneuver or with hands on hips in the sitting position. There is no nipple retraction, discharge or nipple/areolar skin changes.There are no masses palpable in the sitting or supine positions. Specifically, no mass palpable at the RIGHT 5:30 site of malignancy    Breast, LEFT:  large sized, symmetric with the contralateral side except that the RIGHT areola is smaller than the LEFT.  Breast skin is without erythema, edema, rashes.  There are no visible abnormalities upon inspection during the arm-raising maneuver or with hands on hips in the sitting position. There is no nipple retraction, discharge or nipple/areolar skin changes.There are no masses palpable in the sitting or supine positions. Well healed LEFT periareolar incision from excision FA. The lateral radial incision from the radial scar is faded.    Lymphatic:  There is no axillary, cervical, infraclavicular, or supraclavicular adenopathy bilaterally.  Eyes:  Pupils are round and reactive to " light.  Cardiovascular:  Heart rate and rhythm are regular.  Respiratory:  Lungs are clear bilaterally with no crackles or wheezes in any lung field.  Gastrointestinal:  Abdomen is soft, nondistended, and nontender.     Musculoskeletal:  Good strength in all 4 extremities.   There is good range of motion in both shoulders.    Skin:  No new skin lesions or rashes on the skin excluding the breast (see breast exam above).        Imagin2019, MMG Da Screening Bilat ([facility])  The breasts are extremely dense.  Finding 1: Multiple bilateral masses  Finding 2: Questionable area of architectural distortion in the central aspect of the left breast.  IMPRESSION:  BI-RADS 0    2019, Left Diagnostic MMG with Da and CAD and Limited Left Breast U/S ([facility])  Final Assessment: Negative (BI-RADS 1)    2020, Digital Bilateral Screening MMG w/Da ([facility])  The breasts are extremely dense.  BI-RADS 1 Negative    2022, MMG Screening Digital Da Bilateral w/CAD ([facility])  The breasts are extremely dense.  BI-RADS 1 Negative    10/05/2023, MMG Screening Digital Da Bilateral w/CAD ([facility])  There is questionable area of architectural distortion in the upper outer quadrant of the right breast at mid depth.  BI-RADS 0    10/09/2023, MMG Da Diagnostic Right Breast ([facility])  The breasts are heterogeneously dense. There is persistent small areas of architectural distortion in the right upper outer quadrant at middle depth (located 6.7 cm from the nipple on the spot Da CC exam.  BI-RADS 4B    10/19/2023, Right Breast Ultrasound ([facility])  Sonographic survey of the upper outer quadrant right breast was performed by the sonographer and me to evaluate for a possible sonographic correlate. No suspicious abnormality identified. Previously described area of architectural distortion in the upper outer quadrant right breast could not be  reproduced for a stereotactic/tomosynthesis guided biopsy.  BI-RADS 3    05/01/2024, MMG Da Diagnostic Right Breast ([facility])  Right Mammogram: The breast is heterogeneously dense. The previously described area of questionable distortion in the upper outer quadrant of the right breast is not visualized on today's exam.  BI-RADS 1: Negative     10/18/2024, MMG Da Screening Bilateral ([facility])  Heterogeneously dense.  This is an area of architectural distortion in the lower inner quadrant of the right breast at interior depth.  BI-RADS 0    10/22/2024, Right Digital Diagnostic MMG w/CAD and Right Breast U/S ([facility])  There is a persistent 20 mm area of architectural distortion in the lower inner right breast at anterior depth.  Sonographic evaluation of the right breast was performed at 5:00, 2 cm from the nipple. There is an irregular hypoechoic mass which measures 14 mm x 11 mm x 12 mm corresponding to the mammographic finding.  BI-RADS 4C    11/14/24 [facility] MRI BREAST   BILATERAL BREAST MRI   Irregular spiculated mass in the lower slightly inner anterior right   breast at the site of biopsy-proven malignancy measures up to 10 mm. There is a central tissue marker. There is no lymphadrnopathy. No evidence of malignancy in the left breast. BI-RADS Category 6    Pathology:  10/25/24 [place]  US GUIDED BREAST BX RIGHT   12 G Marquee, 3 core specimens, a metallic heart shaped marker clip was placed.     10/25/2024, [facility] Breast Clip Post Biopsy Right ([facility])  The heart shaped biopsy tissue marker is in expected position.  Post procedure MMG for Marker Placement.    10/25/2024, Surgical Specimen Pathology Report  Right Breast, 5;00, 2 CMFN, U/S-Guided Core Needle Biopsies for a 1.4 cm mass/distortion  BI-RADS 4C: HEART CLIP   INVASIVE DUCTAL CARCINOMA    ER 90% STRONG  AZ 90% STRONG   HER2 (Score 2+)  Glandular (Acinar)/Tubular Differentiation: Score 2  Mitotic Rate: Score  1  Overall Grade 1  Tumor Size 7 mm  Ductal Carcinoma in situ (DCIS): Not identified  Lymphatic and /or vascular invasion: Not identified    HER-2/JOVANA, FISH   FISH ANALYSIS HER2 (ERBB2) AMPLIFICATION  INTERPRETATION  Amplification of the HER2 (ERBB2) gene is not detected.   HER2 (ERBB2) (red) 3.2  Ration of HER2 (ERBB2) to CEP17 1.6    11/22/24 [facility]  OUTSIDE PATHOLOGY CONSULTATION   OUTSIDE [facility] CONSULTATION [id number]  1.  Right breast, 5:00, ultrasound-guided core biopsies for mass (heart clip): INVASIVE MAMMARY CARCINOMA, NO SPECIAL TYPE (INVASIVE DUCTAL CARCINOMA).                 -Histologic grade: Dmitry histologic score I (tubules = 2, nuclei = 2, mitosis = 1).               -Carcinoma involves both tissue cores, measuring up to 7 mm maximally.               -No definitive in situ component identified.  -Hormone receptors: ER 90% positive (Antoinette 8/8), ME 90% positive (Antoinette 8/8), HER2 2+ by IHC, HER2/jovana FISH report not provided, Ki67 10%.        Procedures:      Assessment:   Diagnosis Plan   1. Malignant neoplasm of lower-inner quadrant of right breast of female, estrogen receptor positive        2. Breast asymmetry        3. Radial scar of left breast        4. Breast fibroadenoma, left        1-  RIGHT breast 5:30, 1.5-2 CFN, just outside the areolar margin, 1 cm on MRI, 1.5 cm on ultrasound, 7mm in core, anterior 1/3  IMC, NST, low grade, 2,2,1, 7mm in core, no DCIS, no LVI  ER90 ME 90 her 2 jovana 2+, FISH negative 3.2/1.6    Clinical stage T1cN0- IA       2-  RIGHT areolar smaller than LEFT      3-  4-2012 excision LEFT radial scar lateral incision    4-  9-2013 LEFT excision fibroadenoma 9:00 periareolar incision       Plan:  The patient goes by [facility]. She is a patient of mine from 2012 left excision radial scar and 2013 LEFT excision of fibroadenoma.    We reviewed the difference in systemic therapy versus locoregional. She knows that she will be seeing a medical oncologist in the  adjuvant setting. We discussed that for early stage breast cancer, there are 2 options for locoregional treatment that have equivalent survival: total mastectomy versus lumpectomy and radiation, either with sentinel node biopsy.     She is interested in breast conservation.     We discussed the option of oncoplastic closure with contralateral mastopexy, and she is not interested in this.    We discussed her having a plastic surgical consultation in the postoperative period as an alternative option, if she finds that she is dissatisfied with her symmetry. We did discuss that most plastic surgeons will not operate on the irradiated breast, but could perform a reduction on the contralateral side for size symmetry down the road.     We discussed the following procedure:  RIGHT breast ??? Localization lumpectomy, RIGHT axilla sentinel node biopsy      She understands the risks of lumpectomy including bleeding, infection, seroma and 25% chance of positive margins. She understands the risks of  sentinel node biopsy, including 7% chance of lymphedema, injury to nerves or vessels in the axilla,  seroma. We discussed that we would review her pathology from her sentinel node procedure in consideration of a complete axillary dissection, after her procedure.   NCCN guidelines have been followed.    We discussed oncotype postop.    She has already gotten a bra.    I will call in her Rx and fill out her consent and case request if she decides to have us do her case.     Her genetics is pending, and will need preop.  She will receive a recommendation for radiation after surgery.       Her next mammogram is due 10-19-25    The patient's clinical stage is documented as above. This was discussed with the patient prior to initiation of treatment. All available pathology reports were discussed with the patient today. All treatment decisions were made via shared decision making with the patient. This patient was evaluated for appropriate  ancillary referrals including, pre-treatment functional assessment, exercise program, nutrition program, genetics, and lymphedema clinic. This patient received preoperative and postoperative education. The patient was offered a breast reconstruction referral; risks and benefits were discussed today. The patient was educated on perioperative multimodal pain management strategies. Barriers to effective and efficient care are/will be evaluated by the nurse navigator.       Nadia Broderick MD      Today I spent 60 minutes doing the following: Reviewing records, labs, outside imaging and reports in preparation for the patient visit; obtaining medical history; performing the physical exam; counseling and educating the patient and any available family or caregivers; ordering medications, tests or procedures; coordinating care with any other physicians on her care team as needed, and documenting all of the above in the medical record as well as sending communications with her other healthcare professionals.      Next Appointment:  Return for to be determined.      EMR Dragon/transcription disclaimer:    Please note that portions of this note were completed with a voice recognition program.

## 2024-12-05 ENCOUNTER — TELEPHONE (OUTPATIENT)
Dept: SURGERY | Facility: CLINIC | Age: 54
End: 2024-12-05
Payer: COMMERCIAL

## 2024-12-05 NOTE — TELEPHONE ENCOUNTER
Shyla BRCA 1/2 panel with cancer next returned as negative for mutation 11-8-24. 11-9-24 saw genetics at [facility].

## 2025-01-03 ENCOUNTER — TELEPHONE (OUTPATIENT)
Dept: SURGERY | Facility: CLINIC | Age: 55
End: 2025-01-03
Payer: COMMERCIAL

## 2025-01-03 NOTE — TELEPHONE ENCOUNTER
Mrs Shanks had her surgery with Dr Travis Castillo in .    We will have her transfer her care to him/his office for future management.

## (undated) DEVICE — TBG W FLTR FOR BERKELEY SYSTEM

## (undated) DEVICE — NDL SPINE 22G 31/2IN BLK

## (undated) DEVICE — CANSTR SXN UTER NO FLTR SURG

## (undated) DEVICE — PK HYSTSCPY D AND C 40

## (undated) DEVICE — PAD SANI MAXI W/ADHS SNG WRP 11IN

## (undated) DEVICE — ST COL BERKELY TBG

## (undated) DEVICE — GLV SURG BIOGEL LTX PF 6 1/2

## (undated) DEVICE — Device

## (undated) DEVICE — CANN ASP BERKELEY VACURETTE CRV/TP 9MM DISP STRL

## (undated) DEVICE — SACK GZ PERM

## (undated) DEVICE — HOSE BT TO BT VAC CURETTAGE SNGL PT USE

## (undated) DEVICE — CANN VAC GYN VACURETTE BERKELEY CRV 8MM 1P/U STRL